# Patient Record
Sex: FEMALE | URBAN - METROPOLITAN AREA
[De-identification: names, ages, dates, MRNs, and addresses within clinical notes are randomized per-mention and may not be internally consistent; named-entity substitution may affect disease eponyms.]

---

## 2023-06-16 ENCOUNTER — WEB ENCOUNTER (OUTPATIENT)
Dept: URBAN - METROPOLITAN AREA CLINIC 98 | Facility: CLINIC | Age: 78
End: 2023-06-16

## 2023-06-22 ENCOUNTER — OFFICE VISIT (OUTPATIENT)
Dept: URBAN - METROPOLITAN AREA CLINIC 98 | Facility: CLINIC | Age: 78
End: 2023-06-22
Payer: MEDICARE

## 2023-06-22 VITALS
DIASTOLIC BLOOD PRESSURE: 54 MMHG | BODY MASS INDEX: 27.64 KG/M2 | HEIGHT: 66 IN | SYSTOLIC BLOOD PRESSURE: 111 MMHG | WEIGHT: 172 LBS | HEART RATE: 78 BPM | TEMPERATURE: 97.2 F

## 2023-06-22 DIAGNOSIS — R10.32 LEFT LOWER QUADRANT PAIN: ICD-10-CM

## 2023-06-22 DIAGNOSIS — D49.0 IPMN (INTRADUCTAL PAPILLARY MUCINOUS NEOPLASM): ICD-10-CM

## 2023-06-22 DIAGNOSIS — Z15.09 GENETIC SUSCEPTIBILITY TO OTHER MALIGNANT NEOPLASM: ICD-10-CM

## 2023-06-22 DIAGNOSIS — Z15.01 GENETIC SUSCEPTIBILITY TO MALIGNANT NEOPLASM OF BREAST: ICD-10-CM

## 2023-06-22 DIAGNOSIS — R10.31 RIGHT LOWER QUADRANT PAIN: ICD-10-CM

## 2023-06-22 PROCEDURE — 99244 OFF/OP CNSLTJ NEW/EST MOD 40: CPT | Performed by: INTERNAL MEDICINE

## 2023-06-22 PROCEDURE — 99204 OFFICE O/P NEW MOD 45 MIN: CPT | Performed by: INTERNAL MEDICINE

## 2023-06-22 NOTE — HPI-TODAY'S VISIT:
Patient referred by Dr. Ruddy Stevenson and note will be sent over Found to be anemic and started on iron Had primary peritoneal carcinomatosis in 2013 Had chemo followed by hysterectomy.  Last imaging about a year ago More discomfort and lower abdominal bloating Has BRCA2 positive.  Last colon 2-3 years ago. No polyps Mother with colon cancer. MRI in 1/22- Mild panc atrophy and 11 mm IPMN

## 2023-06-27 ENCOUNTER — TELEPHONE ENCOUNTER (OUTPATIENT)
Dept: URBAN - METROPOLITAN AREA CLINIC 63 | Facility: CLINIC | Age: 78
End: 2023-06-27

## 2023-06-29 ENCOUNTER — TELEPHONE ENCOUNTER (OUTPATIENT)
Dept: URBAN - METROPOLITAN AREA CLINIC 98 | Facility: CLINIC | Age: 78
End: 2023-06-29

## 2023-08-02 ENCOUNTER — TELEPHONE ENCOUNTER (OUTPATIENT)
Dept: URBAN - METROPOLITAN AREA CLINIC 98 | Facility: CLINIC | Age: 78
End: 2023-08-02

## 2023-10-24 ENCOUNTER — TELEPHONE ENCOUNTER (OUTPATIENT)
Dept: URBAN - METROPOLITAN AREA CLINIC 98 | Facility: CLINIC | Age: 78
End: 2023-10-24

## 2023-11-02 ENCOUNTER — OFFICE VISIT (OUTPATIENT)
Dept: URBAN - METROPOLITAN AREA CLINIC 98 | Facility: CLINIC | Age: 78
End: 2023-11-02

## 2023-12-01 ENCOUNTER — LAB OUTSIDE AN ENCOUNTER (OUTPATIENT)
Dept: URBAN - METROPOLITAN AREA CLINIC 98 | Facility: CLINIC | Age: 78
End: 2023-12-01

## 2023-12-01 ENCOUNTER — TELEPHONE ENCOUNTER (OUTPATIENT)
Dept: URBAN - METROPOLITAN AREA CLINIC 98 | Facility: CLINIC | Age: 78
End: 2023-12-01

## 2023-12-01 ENCOUNTER — OFFICE VISIT (OUTPATIENT)
Dept: URBAN - METROPOLITAN AREA CLINIC 98 | Facility: CLINIC | Age: 78
End: 2023-12-01
Payer: MEDICARE

## 2023-12-01 DIAGNOSIS — D49.0 IPMN (INTRADUCTAL PAPILLARY MUCINOUS NEOPLASM): ICD-10-CM

## 2023-12-01 DIAGNOSIS — K59.01 CONSTIPATION: ICD-10-CM

## 2023-12-01 DIAGNOSIS — R73.09 ELEVATED HEMOGLOBIN A1C: ICD-10-CM

## 2023-12-01 DIAGNOSIS — Z15.09 GENETIC SUSCEPTIBILITY TO OTHER MALIGNANT NEOPLASM: ICD-10-CM

## 2023-12-01 PROBLEM — 14760008: Status: ACTIVE | Noted: 2023-12-01

## 2023-12-01 PROCEDURE — 99214 OFFICE O/P EST MOD 30 MIN: CPT

## 2023-12-01 NOTE — HPI-OTHER HISTORIES
MRI ABDOMEN WITH AND WITHOUT CONTRAST 7/24/23 - No significant interval change. No acute findings. 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 Pancreatic cyst 1.1 cm unchanged since imaging 7/24/2023

## 2023-12-01 NOTE — HPI-TODAY'S VISIT:
Ms. Luna is a 78 year old female patient of Dr. Ibarra here to follow-up pancreatic cyst. PCP Dr. Perry Lennon; progress note will be sent following office visit. Today 12/1/2023 - History of peritoneal cancer 2013 - MRI 7/2023 unchanged pancreatic cyst - A1C flunctuating over the last 2 years and difficult to lower below 6.9  - Trying diet and exercise; management by PCP - reports could do better - Started on iron supplement 10/23  - No diarrhea, abdominal pain, jaundice, greasy stools - No nausea, vomiting, regurgiation, dyspahga - no unexplained weight loss. - No bright red blood, melena or mucus in stool - colonoscopy due 12/2024 Visit 6/22/2023- Dr. Ibarra Patient referred by Dr. Ruddy Stevenson and note will be sent over Found to be anemic and started on iron Had primary peritoneal carcinomatosis in 2013 Had chemo followed by hysterectomy.  Last imaging about a year ago More discomfort and lower abdominal bloating Has BRCA2 positive.  Last colon 2-3 years ago. No polyps Mother with colon cancer. MRI in 1/22- Mild panc atrophy and 11 mm IPMN

## 2024-01-02 ENCOUNTER — TELEPHONE ENCOUNTER (OUTPATIENT)
Dept: URBAN - METROPOLITAN AREA MEDICAL CENTER 28 | Facility: MEDICAL CENTER | Age: 79
End: 2024-01-02

## 2024-01-31 ENCOUNTER — OFFICE VISIT (OUTPATIENT)
Dept: URBAN - METROPOLITAN AREA MEDICAL CENTER 28 | Facility: MEDICAL CENTER | Age: 79
End: 2024-01-31
Payer: MEDICARE

## 2024-01-31 ENCOUNTER — LAB OUTSIDE AN ENCOUNTER (OUTPATIENT)
Dept: URBAN - METROPOLITAN AREA CLINIC 98 | Facility: CLINIC | Age: 79
End: 2024-01-31

## 2024-01-31 DIAGNOSIS — R93.3 ABN FINDINGS-GI TRACT: ICD-10-CM

## 2024-01-31 DIAGNOSIS — K86.89 ACUTE PANCREATIC FLUID COLLECTION: ICD-10-CM

## 2024-01-31 LAB
GLUCOSE POC: 122
PERFORMING LAB: (no result)

## 2024-01-31 PROCEDURE — 43259 EGD US EXAM DUODENUM/JEJUNUM: CPT | Performed by: INTERNAL MEDICINE

## 2024-03-26 ENCOUNTER — OV EP (OUTPATIENT)
Dept: URBAN - METROPOLITAN AREA CLINIC 98 | Facility: CLINIC | Age: 79
End: 2024-03-26
Payer: MEDICARE

## 2024-03-26 ENCOUNTER — LAB (OUTPATIENT)
Dept: URBAN - METROPOLITAN AREA CLINIC 98 | Facility: CLINIC | Age: 79
End: 2024-03-26

## 2024-03-26 VITALS
SYSTOLIC BLOOD PRESSURE: 90 MMHG | HEART RATE: 84 BPM | WEIGHT: 166.2 LBS | DIASTOLIC BLOOD PRESSURE: 54 MMHG | HEIGHT: 66 IN | BODY MASS INDEX: 26.71 KG/M2 | TEMPERATURE: 97 F

## 2024-03-26 DIAGNOSIS — K92.1 BLOOD IN STOOL: ICD-10-CM

## 2024-03-26 DIAGNOSIS — R73.09 ELEVATED HEMOGLOBIN A1C: ICD-10-CM

## 2024-03-26 DIAGNOSIS — Z15.01 GENETIC SUSCEPTIBILITY TO MALIGNANT NEOPLASM OF BREAST: ICD-10-CM

## 2024-03-26 DIAGNOSIS — D49.0 IPMN (INTRADUCTAL PAPILLARY MUCINOUS NEOPLASM): ICD-10-CM

## 2024-03-26 DIAGNOSIS — Z80.0 FAMILY HISTORY OF COLON CANCER IN MOTHER: ICD-10-CM

## 2024-03-26 DIAGNOSIS — K59.00 CONSTIPATION, UNSPECIFIED CONSTIPATION TYPE: ICD-10-CM

## 2024-03-26 DIAGNOSIS — Z15.09 GENETIC SUSCEPTIBILITY TO OTHER MALIGNANT NEOPLASM: ICD-10-CM

## 2024-03-26 PROBLEM — 428283002: Status: ACTIVE | Noted: 2024-03-26

## 2024-03-26 PROCEDURE — 99213 OFFICE O/P EST LOW 20 MIN: CPT

## 2024-03-26 RX ORDER — POLYETHYLENE GLYCOL 3350, SODIUM CHLORIDE, SODIUM BICARBONATE, POTASSIUM CHLORIDE 420; 11.2; 5.72; 1.48 G/4L; G/4L; G/4L; G/4L
4000 ML POWDER, FOR SOLUTION ORAL AS DIRECTED
Qty: 4000 ML | Refills: 0 | OUTPATIENT
Start: 2024-03-26

## 2024-03-26 RX ORDER — SITAGLIPTIN 50 MG/1
AS DIRECTED TABLET, FILM COATED ORAL
Status: ACTIVE | COMMUNITY

## 2024-03-26 RX ORDER — SODIUM, POTASSIUM,MAG SULFATES 17.5-3.13G
254 ML SOLUTION, RECONSTITUTED, ORAL ORAL AS DIRECTED
Qty: 254 ML | Refills: 0 | OUTPATIENT
Start: 2024-03-26 | End: 2024-03-27

## 2024-03-26 NOTE — HPI-OTHER HISTORIES
MRI ABDOMEN WITH AND WITHOUT CONTRAST 7/24/23 - No significant interval change. No acute findings. 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 Pancreatic cyst 1.1 cm unchanged since imaging 7/24/2023 EUS 1/31/24- Dr. Ibarra - Pancreatic cyst in the body 1 cm; suspicious of side branch IPMN. - Normal duodenum - Normal Esophagus - Normal stomach - No specimens collected during EGD or from pancreatic cyst

## 2024-03-26 NOTE — HPI-TODAY'S VISIT:
Ms. Luna is a 78 year old female patient of Dr. Ibarra here to follow-up pancreatic cyst. PCP Dr. Perry Lennon; progress note will be sent following office visit. Visit 6/22/2023- Dr. Ibarra Patient referred by Dr. Ruddy Stevenson and note will be sent over Found to be anemic and started on iron Had primary peritoneal carcinomatosis in 2013 Had chemo followed by hysterectomy.  Last imaging about a year ago More discomfort and lower abdominal bloating Has BRCA2 positive.  Last colon 2-3 years ago. No polyps Mother with colon cancer. MRI in 1/22- Mild panc atrophy and 11 mm IPMN Today 12/1/2023 - History of peritoneal cancer 2013 - MRI 7/2023 unchanged pancreatic cyst - A1C flunctuating over the last 2 years and difficult to lower below 6.9  - Trying diet and exercise; management by PCP - reports could do better - Started on iron supplement 10/23  - No diarrhea, abdominal pain, jaundice, greasy stools - No nausea, vomiting, regurgiation, dyspahga - no unexplained weight loss. - No bright red blood, melena or mucus in stool - colonoscopy due 12/2024 Visit 3/26/24- Nohemi Benito NP - Here to follow-up blood in stool - 1 week ago noticed blood in stool after uncomfortable BM - Last colonoscopy 12/2019 Dr. Villegas- no previous colon polyps; family history colon cancer mother - Only 1 episode of blood in stool - Denies anal pain, itching - BM every 2-3 days - Stools are formed - Denies melena or mucus in stool - No unintentional weight loss; or change in bowel habits - Since starting januvia has noticed increased lower abdomen pain

## 2024-04-08 ENCOUNTER — COLON (OUTPATIENT)
Dept: URBAN - METROPOLITAN AREA SURGERY CENTER 18 | Facility: SURGERY CENTER | Age: 79
End: 2024-04-08
Payer: MEDICARE

## 2024-04-08 ENCOUNTER — LAB (OUTPATIENT)
Dept: URBAN - METROPOLITAN AREA CLINIC 4 | Facility: CLINIC | Age: 79
End: 2024-04-08
Payer: MEDICARE

## 2024-04-08 DIAGNOSIS — K92.1 ACUTE MELENA: ICD-10-CM

## 2024-04-08 DIAGNOSIS — D12.0 ADENOMA OF CECUM: ICD-10-CM

## 2024-04-08 DIAGNOSIS — D12.0 BENIGN NEOPLASM OF CECUM: ICD-10-CM

## 2024-04-08 PROCEDURE — G8907 PT DOC NO EVENTS ON DISCHARG: HCPCS | Performed by: INTERNAL MEDICINE

## 2024-04-08 PROCEDURE — 45385 COLONOSCOPY W/LESION REMOVAL: CPT | Performed by: INTERNAL MEDICINE

## 2024-04-08 PROCEDURE — 88305 TISSUE EXAM BY PATHOLOGIST: CPT | Performed by: STUDENT IN AN ORGANIZED HEALTH CARE EDUCATION/TRAINING PROGRAM

## 2024-04-08 RX ORDER — SITAGLIPTIN 50 MG/1
AS DIRECTED TABLET, FILM COATED ORAL
Status: ACTIVE | COMMUNITY

## 2024-04-08 RX ORDER — POLYETHYLENE GLYCOL 3350, SODIUM CHLORIDE, SODIUM BICARBONATE, POTASSIUM CHLORIDE 420; 11.2; 5.72; 1.48 G/4L; G/4L; G/4L; G/4L
4000 ML POWDER, FOR SOLUTION ORAL AS DIRECTED
Qty: 4000 ML | Refills: 0 | Status: ACTIVE | COMMUNITY
Start: 2024-03-26

## 2024-05-22 ENCOUNTER — WEB ENCOUNTER (OUTPATIENT)
Dept: URBAN - METROPOLITAN AREA CLINIC 98 | Facility: CLINIC | Age: 79
End: 2024-05-22

## 2024-12-30 ENCOUNTER — TELEPHONE ENCOUNTER (OUTPATIENT)
Dept: URBAN - METROPOLITAN AREA CLINIC 98 | Facility: CLINIC | Age: 79
End: 2024-12-30

## 2025-01-02 ENCOUNTER — LAB OUTSIDE AN ENCOUNTER (OUTPATIENT)
Dept: URBAN - METROPOLITAN AREA CLINIC 98 | Facility: CLINIC | Age: 80
End: 2025-01-02

## 2025-01-30 ENCOUNTER — TELEPHONE ENCOUNTER (OUTPATIENT)
Dept: URBAN - METROPOLITAN AREA CLINIC 98 | Facility: CLINIC | Age: 80
End: 2025-01-30

## 2025-02-10 ENCOUNTER — OFFICE VISIT (OUTPATIENT)
Dept: URBAN - METROPOLITAN AREA CLINIC 98 | Facility: CLINIC | Age: 80
End: 2025-02-10
Payer: MEDICARE

## 2025-02-10 ENCOUNTER — DASHBOARD ENCOUNTERS (OUTPATIENT)
Age: 80
End: 2025-02-10

## 2025-02-10 VITALS
DIASTOLIC BLOOD PRESSURE: 69 MMHG | SYSTOLIC BLOOD PRESSURE: 138 MMHG | HEIGHT: 66 IN | BODY MASS INDEX: 27.32 KG/M2 | HEART RATE: 71 BPM | TEMPERATURE: 97.1 F | WEIGHT: 170 LBS

## 2025-02-10 DIAGNOSIS — Z15.01 GENETIC SUSCEPTIBILITY TO MALIGNANT NEOPLASM OF BREAST: ICD-10-CM

## 2025-02-10 DIAGNOSIS — D49.0 IPMN (INTRADUCTAL PAPILLARY MUCINOUS NEOPLASM): ICD-10-CM

## 2025-02-10 DIAGNOSIS — R14.1 GAS PAIN: ICD-10-CM

## 2025-02-10 DIAGNOSIS — Z86.0101 PERSONAL HISTORY OF ADENOMATOUS AND SERRATED COLON POLYPS: ICD-10-CM

## 2025-02-10 DIAGNOSIS — Z80.0 FAMILY HISTORY OF COLON CANCER IN MOTHER: ICD-10-CM

## 2025-02-10 DIAGNOSIS — Z15.09 GENETIC SUSCEPTIBILITY TO OTHER MALIGNANT NEOPLASM: ICD-10-CM

## 2025-02-10 PROCEDURE — 99213 OFFICE O/P EST LOW 20 MIN: CPT

## 2025-02-10 RX ORDER — SITAGLIPTIN 50 MG/1
AS DIRECTED TABLET, FILM COATED ORAL
Status: ACTIVE | COMMUNITY

## 2025-02-10 RX ORDER — POLYETHYLENE GLYCOL 3350, SODIUM CHLORIDE, SODIUM BICARBONATE, POTASSIUM CHLORIDE 420; 11.2; 5.72; 1.48 G/4L; G/4L; G/4L; G/4L
4000 ML POWDER, FOR SOLUTION ORAL AS DIRECTED
Qty: 4000 ML | Refills: 0 | Status: ACTIVE | COMMUNITY
Start: 2024-03-26

## 2025-02-10 NOTE — HPI-TODAY'S VISIT:
Visit 6/22/2023- Dr. Ibarra Patient referred by Dr. Ruddy Stevenson and note will be sent over Found to be anemic and started on iron Had primary peritoneal carcinomatosis in 2013 Had chemo followed by hysterectomy.  Last imaging about a year ago More discomfort and lower abdominal bloating Has BRCA2 positive.  Last colon 2-3 years ago. No polyps Mother with colon cancer. MRI in 1/22- Mild panc atrophy and 11 mm IPMN Today 12/1/2023 - History of peritoneal cancer 2013 - MRI 7/2023 unchanged pancreatic cyst - A1C flunctuating over the last 2 years and difficult to lower below 6.9  - Trying diet and exercise; management by PCP - reports could do better - Started on iron supplement 10/23  - No diarrhea, abdominal pain, jaundice, greasy stools - No nausea, vomiting, regurgiation, dyspahga - no unexplained weight loss. - No bright red blood, melena or mucus in stool - colonoscopy due 12/2024 Visit 3/26/24- Nohemi Benito NP - Here to follow-up blood in stool - 1 week ago noticed blood in stool after uncomfortable BM - Last colonoscopy 12/2019 Dr. Villegas- no previous colon polyps; family history colon cancer mother - Only 1 episode of blood in stool - Denies anal pain, itching - BM every 2-3 days - Stools are formed - Denies melena or mucus in stool - No unintentional weight loss; or change in bowel habits - Since starting januvia has noticed increased lower abdomen pain  2/10/25- Nohemi Benito NP - 80 yo female here to follow-up annual MRI pancreatic cyst - History of sharp abdominal pain about every 6 days. - Pain last 30-60 minutes; pain is sharp - resolves without medication or changes - BM daily; usually soft - Denies blood in stool - 1/28/25 MRI Abdomen w/wo contrast- Overall stable. Mild atrophy of the pancreas. Notably in tail. Small pancreatic cystic lesions with largest in body/tail junction. Similar to prior exam. Could be low-grade/benign IPMN. Repeat imaging 18-24 months. Mild hepatic steatosis. 8 mm right renal angiomyolipoma.

## 2025-08-21 ENCOUNTER — OFFICE VISIT (OUTPATIENT)
Dept: URBAN - METROPOLITAN AREA CLINIC 98 | Facility: CLINIC | Age: 80
End: 2025-08-21
Payer: MEDICARE

## 2025-08-21 DIAGNOSIS — Z86.0101 PERSONAL HISTORY OF ADENOMATOUS AND SERRATED COLON POLYPS: ICD-10-CM

## 2025-08-21 DIAGNOSIS — D49.0 IPMN (INTRADUCTAL PAPILLARY MUCINOUS NEOPLASM): ICD-10-CM

## 2025-08-21 DIAGNOSIS — R14.1 GAS PAIN: ICD-10-CM

## 2025-08-21 DIAGNOSIS — Z80.0 FAMILY HISTORY OF COLON CANCER IN MOTHER: ICD-10-CM

## 2025-08-21 DIAGNOSIS — Z15.09 GENETIC SUSCEPTIBILITY TO OTHER MALIGNANT NEOPLASM: ICD-10-CM

## 2025-08-21 DIAGNOSIS — Z15.01 GENETIC SUSCEPTIBILITY TO MALIGNANT NEOPLASM OF BREAST: ICD-10-CM

## 2025-08-21 PROCEDURE — 99214 OFFICE O/P EST MOD 30 MIN: CPT | Performed by: INTERNAL MEDICINE

## 2025-08-21 RX ORDER — SITAGLIPTIN 50 MG/1
AS DIRECTED TABLET, FILM COATED ORAL
Status: ACTIVE | COMMUNITY

## 2025-08-21 RX ORDER — POLYETHYLENE GLYCOL 3350, SODIUM CHLORIDE, SODIUM BICARBONATE, POTASSIUM CHLORIDE 420; 11.2; 5.72; 1.48 G/4L; G/4L; G/4L; G/4L
4000 ML POWDER, FOR SOLUTION ORAL AS DIRECTED
Qty: 4000 ML | Refills: 0 | Status: ON HOLD | COMMUNITY
Start: 2024-03-26